# Patient Record
Sex: MALE
[De-identification: names, ages, dates, MRNs, and addresses within clinical notes are randomized per-mention and may not be internally consistent; named-entity substitution may affect disease eponyms.]

---

## 2023-12-07 ENCOUNTER — DASHBOARD ENCOUNTERS (OUTPATIENT)
Age: 23
End: 2023-12-07

## 2023-12-07 ENCOUNTER — OFFICE VISIT (OUTPATIENT)
Dept: URBAN - METROPOLITAN AREA CLINIC 27 | Facility: CLINIC | Age: 23
End: 2023-12-07
Payer: COMMERCIAL

## 2023-12-07 VITALS
SYSTOLIC BLOOD PRESSURE: 134 MMHG | BODY MASS INDEX: 21.67 KG/M2 | DIASTOLIC BLOOD PRESSURE: 95 MMHG | HEIGHT: 72 IN | WEIGHT: 160 LBS | HEART RATE: 72 BPM

## 2023-12-07 DIAGNOSIS — R94.5 LIVER ENZYMES ABNORMAL: ICD-10-CM

## 2023-12-07 DIAGNOSIS — R17 JAUNDICE: ICD-10-CM

## 2023-12-07 DIAGNOSIS — R11.0 NAUSEA: ICD-10-CM

## 2023-12-07 DIAGNOSIS — R10.13 EPIGASTRIC ABDOMINAL PAIN: ICD-10-CM

## 2023-12-07 PROCEDURE — 99244 OFF/OP CNSLTJ NEW/EST MOD 40: CPT | Performed by: INTERNAL MEDICINE

## 2023-12-07 PROCEDURE — 99204 OFFICE O/P NEW MOD 45 MIN: CPT | Performed by: INTERNAL MEDICINE

## 2023-12-07 NOTE — HPI-TODAY'S VISIT:
Patient here at the request of Dr. Morgan Johnston for evaluation of elevated liver enzymes.  He presented to Dr. Johnston's office on November 1 with a 2-week history of jaundice and dark urine.  He also had intermittent nausea, bloating, upper abdominal pain (predominantly epigastric and right upper quadrant) and a 14 pound weight loss.  He denies any trigger for the onset of his symptoms (ie sick contacts, unusual p.o. intake, etc).  He had not had these symptoms previously.  He states that the abdominal pain was "like things were blocked". The pain was not nocturnal and did not last particularly long.  He did not take any medication for his symptoms.  His appetite has been normal.  His symptoms are definitely worse when he "overeats".  He also had a nonpruritic erythematous rash on his palms, chest and face.  He went to Dr. Johnston's office on November 1.  Labs there showed a white count of 13.1 and markedly elevated liver enzymes (TB 5.1, , AST 59, ). Lipase was normal. He underwent abdominal sonography on November 2 which was without acute pathology aside from a 2.3cm area of decreased echogenicity with complexity involving the interpolar region of the right mid kidney.  He subsequently underwent CT abdomen with IV contrast which was unremarkable.  He had a recheck of his labs on November 2.  His liver enzymes had improved somewhat (TB 4.7, , AST 60, ).  Acute viral hepatitis serologies were negative.  His direct bilirubin was 2.8.  He was mildly iron deficient but his hemoglobin and ferritin were normal.  He had another recheck of his labs on November 30.  His white count and liver enzymes had all returned to normal.  He states that he currently feels mostly well aside from intermittent right upper quadrant discomfort.  He has been taking milk thistle for the past 5 days.  His amylase was normal. He admits that he has not been eating a healthy diet. He is making a more conscious effort lately to do so. There is no family history of liver disease, colon polyps or colon cancer.  He does have mild/moderate anxiety.  His rash has slowly improved and his jaundice has resolved.

## 2023-12-08 ENCOUNTER — TELEPHONE ENCOUNTER (OUTPATIENT)
Dept: URBAN - METROPOLITAN AREA CLINIC 27 | Facility: CLINIC | Age: 23
End: 2023-12-08

## 2023-12-08 ENCOUNTER — LAB OUTSIDE AN ENCOUNTER (OUTPATIENT)
Dept: URBAN - METROPOLITAN AREA CLINIC 27 | Facility: CLINIC | Age: 23
End: 2023-12-08

## 2023-12-15 ENCOUNTER — TELEPHONE ENCOUNTER (OUTPATIENT)
Dept: URBAN - METROPOLITAN AREA CLINIC 27 | Facility: CLINIC | Age: 23
End: 2023-12-15

## 2023-12-18 ENCOUNTER — TELEPHONE ENCOUNTER (OUTPATIENT)
Dept: URBAN - METROPOLITAN AREA CLINIC 27 | Facility: CLINIC | Age: 23
End: 2023-12-18

## 2024-01-10 ENCOUNTER — OFFICE VISIT (OUTPATIENT)
Dept: URBAN - METROPOLITAN AREA SURGERY CENTER 7 | Facility: SURGERY CENTER | Age: 24
End: 2024-01-10

## 2025-01-13 ENCOUNTER — LAB OUTSIDE AN ENCOUNTER (OUTPATIENT)
Dept: URBAN - METROPOLITAN AREA CLINIC 27 | Facility: CLINIC | Age: 25
End: 2025-01-13

## 2025-01-13 ENCOUNTER — OFFICE VISIT (OUTPATIENT)
Dept: URBAN - METROPOLITAN AREA CLINIC 27 | Facility: CLINIC | Age: 25
End: 2025-01-13
Payer: COMMERCIAL

## 2025-01-13 VITALS
SYSTOLIC BLOOD PRESSURE: 137 MMHG | HEART RATE: 87 BPM | WEIGHT: 154 LBS | DIASTOLIC BLOOD PRESSURE: 90 MMHG | BODY MASS INDEX: 20.86 KG/M2 | HEIGHT: 72 IN

## 2025-01-13 DIAGNOSIS — F41.9 ANXIETY: ICD-10-CM

## 2025-01-13 DIAGNOSIS — R13.10 DYSPHAGIA: ICD-10-CM

## 2025-01-13 DIAGNOSIS — R11.0 NAUSEA: ICD-10-CM

## 2025-01-13 DIAGNOSIS — K21.9 GERD: ICD-10-CM

## 2025-01-13 DIAGNOSIS — R10.11 RIGHT UPPER QUADRANT ABDOMINAL PAIN: ICD-10-CM

## 2025-01-13 DIAGNOSIS — R14.0 BLOATING: ICD-10-CM

## 2025-01-13 PROCEDURE — 99204 OFFICE O/P NEW MOD 45 MIN: CPT | Performed by: INTERNAL MEDICINE

## 2025-01-13 NOTE — HPI-TODAY'S VISIT:
Patient here with multiple GI complaints that have been present for at least the past month or so. He has had a few episodes of right upper quadrant pain which "felt like a balloon". The pain lasted a few hours to a day. He did not take any medication for this. He thinks that the pain may have been triggered by eating fatty foods. During the episodes of abdominal pain, he had some mild nausea and bloating. He has intermittent reflux symptoms but does not take anything for this. He has occasional mild dysphagia and states that "I have a squeezing in my esophagus" and "sometimes it is hard to swallow"; however the food bolus does not lodge. He continues to have suboptimally-controlled anxiety and does not take anything for this. He has no other GI symptoms currently. A recent abdominal sonogram was normal aside from borderline hepatomegaly. Recent labs were normal aside from a bilirubin of 1.3.   He presented to his PCP's office in late 2023 with a 2-week history of jaundice and dark urine.  He also had intermittent nausea, bloating, upper abdominal pain (predominantly epigastric and right upper quadrant) and a 14 pound weight loss.  He denies any trigger for the onset of his symptoms (ie sick contacts, unusual p.o. intake, etc).  He had not had these symptoms previously.  He states that the abdominal pain was "like things were blocked". The pain was not nocturnal and did not last particularly long.  He did not take any medication for his symptoms.  His appetite has been normal.  His symptoms are definitely worse when he "overeats".  He also had a nonpruritic erythematous rash on his palms, chest and face.  He went to Dr. Johnston's office on November 1.  Labs there showed a white count of 13.1 and markedly elevated liver enzymes (TB 5.1, , AST 59, ). Lipase was normal. He underwent abdominal sonography on November 2 which was without acute pathology aside from a 2.3cm area of decreased echogenicity with complexity involving the interpolar region of the right mid kidney.  He subsequently underwent CT abdomen with IV contrast which was unremarkable.  He had a recheck of his labs on November 2.  His liver enzymes had improved somewhat (TB 4.7, , AST 60, ).  Acute viral hepatitis serologies were negative.  His direct bilirubin was 2.8.  He was mildly iron deficient but his hemoglobin and ferritin were normal.  He had another recheck of his labs on November 30.  His white count and liver enzymes had all returned to normal. MRCP in late 2023 was normal. Serologic evaluation in this office in late 2023 revealed a negative celiac panel, normal SPEP, liver enzymes, negative AMA, ASMA and normal ceruloplasmin. There is no family history of liver disease, colon polyps or colon cancer.

## 2025-01-14 LAB — H PYLORI BREATH TEST: NOT DETECTED

## 2025-01-15 ENCOUNTER — LAB OUTSIDE AN ENCOUNTER (OUTPATIENT)
Dept: URBAN - METROPOLITAN AREA CLINIC 27 | Facility: CLINIC | Age: 25
End: 2025-01-15

## 2025-02-14 ENCOUNTER — OFFICE VISIT (OUTPATIENT)
Dept: URBAN - METROPOLITAN AREA SURGERY CENTER 7 | Facility: SURGERY CENTER | Age: 25
End: 2025-02-14